# Patient Record
Sex: FEMALE | Race: WHITE | NOT HISPANIC OR LATINO | Employment: OTHER | URBAN - METROPOLITAN AREA
[De-identification: names, ages, dates, MRNs, and addresses within clinical notes are randomized per-mention and may not be internally consistent; named-entity substitution may affect disease eponyms.]

---

## 2017-12-15 PROBLEM — E78.00 PURE HYPERCHOLESTEROLEMIA: Status: ACTIVE | Noted: 2017-12-15

## 2018-06-20 PROBLEM — R19.03 ABDOMINAL MASS, RIGHT LOWER QUADRANT: Status: ACTIVE | Noted: 2018-06-20

## 2018-08-14 PROBLEM — E78.00 PURE HYPERCHOLESTEROLEMIA: Status: RESOLVED | Noted: 2017-12-15 | Resolved: 2018-08-14

## 2020-02-02 PROBLEM — K92.2 ACUTE LOWER GI BLEEDING: Status: ACTIVE | Noted: 2020-02-02

## 2020-02-02 PROBLEM — E86.1 INTRAVASCULAR VOLUME DEPLETION: Status: ACTIVE | Noted: 2020-02-02

## 2020-02-02 PROBLEM — K52.9 ACUTE COLITIS: Status: ACTIVE | Noted: 2020-02-02

## 2020-02-06 ENCOUNTER — TELEPHONE (OUTPATIENT)
Dept: GASTROENTEROLOGY | Facility: CLINIC | Age: 70
End: 2020-02-06

## 2020-02-06 NOTE — TELEPHONE ENCOUNTER
----- Message from Darlin Byers sent at 2/6/2020  9:59 AM CST -----  Contact: pt [phone]  Patient will need a 1 week post op from her colonoscopy that was done yesterday.

## 2020-02-14 ENCOUNTER — OFFICE VISIT (OUTPATIENT)
Dept: GASTROENTEROLOGY | Facility: CLINIC | Age: 70
End: 2020-02-14
Payer: COMMERCIAL

## 2020-02-14 VITALS
BODY MASS INDEX: 23.64 KG/M2 | HEIGHT: 66 IN | SYSTOLIC BLOOD PRESSURE: 122 MMHG | DIASTOLIC BLOOD PRESSURE: 78 MMHG | WEIGHT: 147.06 LBS | HEART RATE: 86 BPM

## 2020-02-14 DIAGNOSIS — Z09 HOSPITAL DISCHARGE FOLLOW-UP: Primary | ICD-10-CM

## 2020-02-14 DIAGNOSIS — Z86.2 HISTORY OF ANEMIA: ICD-10-CM

## 2020-02-14 DIAGNOSIS — K55.9 ISCHEMIC COLITIS: ICD-10-CM

## 2020-02-14 DIAGNOSIS — R10.9 ABDOMINAL CRAMPING: ICD-10-CM

## 2020-02-14 DIAGNOSIS — K52.839 MICROSCOPIC COLITIS, UNSPECIFIED MICROSCOPIC COLITIS TYPE: ICD-10-CM

## 2020-02-14 PROCEDURE — 99214 OFFICE O/P EST MOD 30 MIN: CPT | Mod: S$GLB,,, | Performed by: NURSE PRACTITIONER

## 2020-02-14 PROCEDURE — 99999 PR PBB SHADOW E&M-EST. PATIENT-LVL IV: ICD-10-PCS | Mod: PBBFAC,,, | Performed by: NURSE PRACTITIONER

## 2020-02-14 PROCEDURE — 99214 PR OFFICE/OUTPT VISIT, EST, LEVL IV, 30-39 MIN: ICD-10-PCS | Mod: S$GLB,,, | Performed by: NURSE PRACTITIONER

## 2020-02-14 PROCEDURE — 99999 PR PBB SHADOW E&M-EST. PATIENT-LVL IV: CPT | Mod: PBBFAC,,, | Performed by: NURSE PRACTITIONER

## 2020-02-14 RX ORDER — ASPIRIN 325 MG
325 TABLET, DELAYED RELEASE (ENTERIC COATED) ORAL DAILY
Qty: 30 TABLET | Refills: 11
Start: 2020-02-14 | End: 2021-01-19

## 2020-02-14 NOTE — PROGRESS NOTES
"Subjective:       Patient ID: Lavonne Hernandez is a 69 y.o. female Body mass index is 23.73 kg/m².    Chief Complaint: Follow-up (hospital follow-up)    This patient is new to me.  Established patient of Dr. Avery.     Reviewed hospital discharge summary: "Admission Date: 2/2/2020  Hospital Length of Stay: 2 days  Discharge Date and Time: 2/6/2020 11:47 AM  Attending Physician: Dr. Guerin  Discharging Provider: Con Guerin MD  Primary Care Provider: Nubia Gayle MD  HPI:   Patient is a 69 year old female with past medical history of breast cancer, depression, hyperlipidemia presents with complaints of abdominal pain. Patient states that she has been having issues with loose stool since 12/2019. Patient states that she went to see her PCP and did some x-rays which was suggestive of constipation. She tried a bowel regimen with some success and eventually had some solid stool but then started to have some loose stools again. Patient states that she was feeling constipated so she tried some magnesium citrate this past week prior to admission to clear herself out. Patient states that she has had some abdominal cramping. She started to have some bright red blood per rectum. She denies any fever or chills. Upon evaluation in the ED, patient underwent CT-abd/pelvis which showed pancolitis. She was started on some IV antibiotics. Hospital medicine was consulted for further evaluation.  Procedure(s) (LRB):  COLONOSCOPY (N/A)    Hospital Course:   Patient was admitted to the hospital medicine service with complaints of abdominal pain. CT-abd/pelvis shows pancolitis. Patient was started on IV antibiotics. Patient went for a colonoscopy. Leukocytosis was noted to improve. Patient was noted to have some labile blood pressures initially with episodes of hypotension but improved if IV fluids. Findings of colonoscopy can be found above. Patient's diet was advanced which she was tolerating well. Hypercoagulable work up was " "done which will need to be follow up as an outpatient. Patient was transitioned to oral antibiotics and was eventually discharged home in stable condition with outpatient follow up."    GI Problem   The primary symptoms include abdominal pain (occasional cramping with bowel movement; otherwise negative for abdominal pain; bentyl 20 mg qid prn- helping), nausea and vomiting (vomited once on 2/11/2020 after exercising at the gym; emesis of food; none since then). Primary symptoms do not include fever, weight loss, fatigue, diarrhea, melena, hematemesis, hematochezia or dysuria. The problem has been rapidly improving.   The illness does not include chills, dysphagia, odynophagia or constipation. Associated symptoms comments: Bowel movements 2 small bowel movements daily of formed to fluffy consistency, improving each day; finished antibiotics (cipro & flagyl) last night. Significant associated medical issues include GERD (controlled on prilosec 20 mg once daily).     Review of Systems   Constitutional: Negative for appetite change, chills, fatigue, fever and weight loss.        Patient reports she had donated blood a day or so prior to her hospitalization   HENT: Negative for sore throat and trouble swallowing.    Respiratory: Negative for cough, choking and shortness of breath.    Cardiovascular: Negative for chest pain.   Gastrointestinal: Positive for abdominal pain (occasional cramping with bowel movement; otherwise negative for abdominal pain; bentyl 20 mg qid prn- helping), nausea and vomiting (vomited once on 2/11/2020 after exercising at the gym; emesis of food; none since then). Negative for anal bleeding, blood in stool, constipation, diarrhea, dysphagia, hematemesis, hematochezia, melena and rectal pain.   Genitourinary: Negative for difficulty urinating, dysuria and flank pain.   Neurological: Negative for weakness.       No LMP recorded. Patient is postmenopausal.  Past Medical History:   Diagnosis Date "    Allergy     Breast cancer, stage 2 2009    left sided, s/p double mastectomy, following with [facility]    Colon polyp     Depression     Migraines     Osteopenia     Boniva use for one year    Pure hypercholesterolemia 12/15/2017    [place] node     biopsy     Past Surgical History:   Procedure Laterality Date    BREAST RECONSTRUCTION Bilateral     BREAST SURGERY  6/09    s/p bilateral mastectomy-8/09    CERVICAL CONIZATION   W/ LASER      COLONOSCOPY N/A 2/5/2020    Aden Avery Jr., MD;  Location: [facility];  Service: Endoscopy;  Laterality: N/A; Congested, erythematous, eroded and inflamed mucosa in the sigmoid colon. Biopsied, The examination was suspicious for left-sided ischemic colitis, Erythematous mucosa at the splenic flexure, in the transverse colon, at the hepatic flexure, in the ascending & cecum, hemorrhoids; biopsy: MICROSCOPIC COLITIS    FACIAL COSMETIC SURGERY  09/06/2018    MASTECTOMY Bilateral 2009     Family History   Problem Relation Age of Onset    Cancer Mother 87        Breast ca    Diabetes Mother     Cataracts Mother     Breast cancer Mother 87    Hypertension Father     Diabetes Brother     Stroke Maternal Grandfather     Cataracts Sister     No Known Problems Maternal Aunt     No Known Problems Maternal Uncle     No Known Problems Paternal Aunt     No Known Problems Paternal Uncle     No Known Problems Maternal Grandmother     No Known Problems Paternal Grandmother     No Known Problems Paternal Grandfather     Heart disease Neg Hx     Melanoma Neg Hx     Amblyopia Neg Hx     Blindness Neg Hx     Glaucoma Neg Hx     Macular degeneration Neg Hx     Retinal detachment Neg Hx     Strabismus Neg Hx     Thyroid disease Neg Hx     Colon cancer Neg Hx     Colon polyps Neg Hx     Crohn's disease Neg Hx     Ulcerative colitis Neg Hx     Celiac disease Neg Hx      Wt Readings from Last 10 Encounters:   02/14/20 66.7 kg (147 lb 0.8 oz)   02/02/20 64.4 kg  (142 lb)   01/13/20 66.9 kg (147 lb 8 oz)   01/03/20 67.2 kg (148 lb 1.6 oz)   12/16/19 67.6 kg (149 lb 0.5 oz)   07/23/19 71.7 kg (158 lb)   06/11/19 74.1 kg (163 lb 4.8 oz)   01/22/19 74.8 kg (165 lb)   08/14/18 73.5 kg (162 lb)   06/20/18 73 kg (161 lb)     Lab Results   Component Value Date    WBC 6.06 02/06/2020    HGB 11.3 (L) 02/06/2020    HCT 34.9 (L) 02/06/2020    MCV 89 02/06/2020     02/06/2020     CMP  Sodium   Date Value Ref Range Status   02/06/2020 141 136 - 145 mmol/L Final     Potassium   Date Value Ref Range Status   02/06/2020 3.7 3.5 - 5.1 mmol/L Final     Chloride   Date Value Ref Range Status   02/06/2020 109 95 - 110 mmol/L Final     CO2   Date Value Ref Range Status   02/06/2020 27 22 - 31 mmol/L Final     Glucose   Date Value Ref Range Status   02/06/2020 94 70 - 110 mg/dL Final     Comment:     The ADA recommends the following guidelines for fasting glucose:  Normal:       less than 100 mg/dL  Prediabetes:  100 mg/dL to 125 mg/dL  Diabetes:     126 mg/dL or higher       BUN, Bld   Date Value Ref Range Status   02/06/2020 3 (L) 7 - 18 mg/dL Final     Creatinine   Date Value Ref Range Status   02/06/2020 0.70 0.50 - 1.40 mg/dL Final     Calcium   Date Value Ref Range Status   02/06/2020 8.8 8.4 - 10.2 mg/dL Final     Total Protein   Date Value Ref Range Status   02/02/2020 7.1 6.0 - 8.4 g/dL Final     Albumin   Date Value Ref Range Status   02/02/2020 4.2 3.5 - 5.2 g/dL Final     Total Bilirubin   Date Value Ref Range Status   02/02/2020 0.4 0.2 - 1.3 mg/dL Final     Alkaline Phosphatase   Date Value Ref Range Status   02/02/2020 62 38 - 145 U/L Final     AST   Date Value Ref Range Status   02/02/2020 32 14 - 36 U/L Final     ALT   Date Value Ref Range Status   02/02/2020 23 0 - 35 U/L Final     Anion Gap   Date Value Ref Range Status   02/06/2020 5 (L) 8 - 16 mmol/L Final     eGFR if    Date Value Ref Range Status   02/06/2020 >60 >60 mL/min/1.73 m^2 Final     eGFR if  non    Date Value Ref Range Status   02/06/2020 >60 >60 mL/min/1.73 m^2 Final     Comment:     Calculation used to obtain the estimated glomerular filtration  rate (eGFR) is the CKD-EPI equation.        Lab Results   Component Value Date    TSH 1.650 05/12/2016     Reviewed prior medical records including radiology report of 2/2/2020 ct abdomen pelvis; 7/3/18 limited abdominal ultrasound; & endoscopy history (see surgical history).    Objective:      Physical Exam   Constitutional: She is oriented to person, place, and time. She appears well-developed and well-nourished. No distress.   HENT:   Mouth/Throat: Oropharynx is clear and moist and mucous membranes are normal. No oral lesions. No oropharyngeal exudate.   Eyes: Pupils are equal, round, and reactive to light. Conjunctivae are normal. No scleral icterus.   Pulmonary/Chest: Effort normal and breath sounds normal. No respiratory distress. She has no wheezes.   Abdominal: Soft. Normal appearance and bowel sounds are normal. She exhibits no distension, no abdominal bruit and no mass. There is tenderness (minimal) in the right lower quadrant, suprapubic area and left lower quadrant. There is no rigidity, no rebound, no guarding, no tenderness at McBurney's point and negative Rhoades's sign.   Neurological: She is alert and oriented to person, place, and time.   Skin: Skin is warm and dry. No rash noted. She is not diaphoretic. No erythema. No pallor.   Non-jaundiced   Psychiatric: She has a normal mood and affect. Her behavior is normal. Judgment and thought content normal.   Nursing note and vitals reviewed.      Assessment:       1. Hospital discharge follow-up    2. Microscopic colitis, unspecified microscopic colitis type    3. History of anemia    4. Ischemic colitis    5. Abdominal cramping        Plan:     discussed case with Dr. Avery; Dr. Avery reports he strongly believes she had ischemic colitis since microscopic colitis does not typically  cause visible endoscopic findings. Dr. Avery recommends patient start aspirin 325 mg once daily    Hospital discharge follow-up, Microscopic colitis, unspecified microscopic colitis type, & Ischemic colitis  -  START OTC  aspirin (ECOTRIN) 325 MG EC tablet; Take 1 tablet (325 mg total) by mouth once daily.  Dispense: 30 tablet; Refill: 11  - if diarrhea recurs, recommend patient follow-up for further evaluation and management; patient verbalized understanding. Dr. Avery recommended if diarrhea recurs to treat it with a course of pepto bismuth.    History of anemia  -     CBC auto differential; Future; Expected date: 02/28/202  Recommend follow-up with Primary Care Provider for continued evaluation and management.    Abdominal cramping  - CONTINUE BENTYL 20 MG QID PRN AS DIRECTED  - CONTINUE OTC PROBIOTIC ONCE DAILY AS DIRECTED    Follow up in about 1 month (around 3/14/2020), or if symptoms worsen or fail to improve, for follow-up with Dr. Avery for continued evaluation and management.      If no improvement in symptoms or symptoms worsen, call/follow-up at clinic or go to ER.

## 2021-01-19 PROBLEM — K52.9 COLITIS: Status: RESOLVED | Noted: 2020-02-02 | Resolved: 2021-01-19

## 2021-01-19 PROBLEM — K92.2 ACUTE LOWER GI BLEEDING: Status: RESOLVED | Noted: 2020-02-02 | Resolved: 2021-01-19

## 2023-05-15 ENCOUNTER — CLINICAL SUPPORT (OUTPATIENT)
Dept: AUDIOLOGY | Facility: CLINIC | Age: 73
End: 2023-05-15
Payer: MEDICARE

## 2023-05-15 DIAGNOSIS — H90.3 BILATERAL SENSORINEURAL HEARING LOSS: Primary | ICD-10-CM

## 2023-05-15 DIAGNOSIS — H93.13 BILATERAL TINNITUS: ICD-10-CM

## 2023-05-15 DIAGNOSIS — H91.93 BILATERAL HEARING LOSS, UNSPECIFIED HEARING LOSS TYPE: ICD-10-CM

## 2023-05-15 PROCEDURE — 99999 PR PBB SHADOW E&M-EST. PATIENT-LVL I: ICD-10-PCS | Mod: PBBFAC,,, | Performed by: AUDIOLOGIST

## 2023-05-15 PROCEDURE — 92557 COMPREHENSIVE HEARING TEST: CPT | Mod: PBBFAC,PO | Performed by: AUDIOLOGIST

## 2023-05-15 PROCEDURE — 92567 TYMPANOMETRY: CPT | Mod: PBBFAC,PO | Performed by: AUDIOLOGIST

## 2023-05-15 PROCEDURE — 99999 PR PBB SHADOW E&M-EST. PATIENT-LVL I: CPT | Mod: PBBFAC,,, | Performed by: AUDIOLOGIST

## 2023-05-15 PROCEDURE — 99211 OFF/OP EST MAY X REQ PHY/QHP: CPT | Mod: PBBFAC,PO | Performed by: AUDIOLOGIST

## 2023-05-15 NOTE — PROGRESS NOTES
Lavonne Hernandez was seen 05/15/2023 for an audiological evaluation.      Pt reported difficulty understanding speech clearly in restaurants.  She has bilateral tinnitus.  Her last audiogram in 2014 showed essentially normal hearing bilaterally.      Otoscopy revealed clear view of both external ear canals and tympanic membranes.  No obstructive cerumen present in either ear canal.       Audiogram results revealed a mild-to-moderate sensorineural hearing loss for the right ear and a mild-to-moderate sensorineural hearing loss for the left ear.  Speech Reception Thresholds were 20 dBHL for the right ear and 20 dBHL for the left ear.  Word recognition scores were good for the right ear and good for the left ear.   Tympanograms were Type Ad for the right ear and Type Ad for the left ear.    Audiogram results were reviewed in detail with patient and all questions were answered. Results will be reviewed by referring provider at the completion of this note.     Recommend binaural amplification pending medical clearance, hearing protection for all loud sounds and annual audiogram to monitor hearing loss.

## 2023-05-15 NOTE — Clinical Note
"Kin Anderson,   Please see the attached audiogram and notes from Lavonne Hernandez's visit to Audiology today.  I reviewed the recommendations with her and answered her questions.  If you do not have any concerns about her proceeding with hearing aids please document in her chart that she is "medically cleared for hearing aids" so that she can proceed with getting them when she is ready to do so. Thank you for referring her to audiology.    Imani Underwood"

## 2023-07-25 ENCOUNTER — OFFICE VISIT (OUTPATIENT)
Dept: OTOLARYNGOLOGY | Facility: CLINIC | Age: 73
End: 2023-07-25
Payer: MEDICARE

## 2023-07-25 VITALS — BODY MASS INDEX: 26.33 KG/M2 | WEIGHT: 163.81 LBS | HEIGHT: 66 IN

## 2023-07-25 DIAGNOSIS — Z78.9 INTOLERANCE OF CONTINUOUS POSITIVE AIRWAY PRESSURE (CPAP) VENTILATION: ICD-10-CM

## 2023-07-25 DIAGNOSIS — G47.33 OSA (OBSTRUCTIVE SLEEP APNEA): Primary | ICD-10-CM

## 2023-07-25 PROCEDURE — 99204 PR OFFICE/OUTPT VISIT, NEW, LEVL IV, 45-59 MIN: ICD-10-PCS | Mod: S$PBB,,, | Performed by: OTOLARYNGOLOGY

## 2023-07-25 PROCEDURE — 99204 OFFICE O/P NEW MOD 45 MIN: CPT | Mod: S$PBB,,, | Performed by: OTOLARYNGOLOGY

## 2023-07-25 PROCEDURE — 99999 PR PBB SHADOW E&M-EST. PATIENT-LVL IV: ICD-10-PCS | Mod: PBBFAC,,, | Performed by: OTOLARYNGOLOGY

## 2023-07-25 PROCEDURE — 99214 OFFICE O/P EST MOD 30 MIN: CPT | Mod: PBBFAC,PO | Performed by: OTOLARYNGOLOGY

## 2023-07-25 PROCEDURE — 99999 PR PBB SHADOW E&M-EST. PATIENT-LVL IV: CPT | Mod: PBBFAC,,, | Performed by: OTOLARYNGOLOGY

## 2023-07-25 NOTE — PROGRESS NOTES
Subjective:       Patient ID: Lavonne Hernandez is a 72 y.o. female.    Chief Complaint: discuss yamilet Banerjee is here for Obstructive sleep apnea and intolerance of CPAP.  she was diagnosed with MAURIZIO  years ago.   Last sleep study: 2016. CHRIS / AHI: 37; was positional at that time, but I do not have the data  she has issues with CPAP compliance: She had difficulty with a titration   Admittedly, she hasn't tried it in a few years. She has a good friend who is a neurobiologist who focuses on sleep and recommended that she retrial CPAP prior to surgical therapy.   Previous surgical treatments for sleep apnea: none  Body mass index is 26.44 kg/m².      Pertinent medical issues: Depression, migraines  Anticoagulation: none  Pertinent surgery: none    Patient validated questionnaires (if applicable):     NOSE score:: (P) 25%       No flowsheet data found.  No flowsheet data found.  No flowsheet data found.         Social History     Tobacco Use   Smoking Status Former    Years: 6.00    Types: Cigarettes    Quit date: 1977    Years since quittin.6    Passive exposure: Past   Smokeless Tobacco Never     Social History     Substance and Sexual Activity   Alcohol Use No          Objective:        Constitutional:   She is oriented to person, place, and time. She appears well-developed and well-nourished. She appears alert. She is active. Normal speech.      Head:  Normocephalic and atraumatic. Head is without TMJ tenderness. No scars. Salivary glands normal.  Facial strength is normal.      Ears:    Right Ear: No drainage or swelling. No middle ear effusion.   Left Ear: No drainage or swelling.  No middle ear effusion.     Nose:  No mucosal edema, rhinorrhea or sinus tenderness. No turbinate hypertrophy.      Mouth/Throat  Oropharynx clear and moist without lesions or asymmetry, normal uvula midline and mirror exam normal. Normal dentition. No uvula swelling, lacerations or trismus. No oropharyngeal exudate.  Tonsillar erythema, tonsillar exudate.      Neck:  Full range of motion with neck supple and no adenopathy. Thyroid tenderness is present. No tracheal deviation, no edema, no erythema, normal range of motion, no stridor, no crepitus and no neck rigidity present. No thyroid mass present.     Cardiovascular:    Intact distal pulses and normal pulses.              Pulmonary/Chest:   Effort normal and breath sounds normal. No stridor.     Psychiatric:   Her speech is normal and behavior is normal. Her mood appears not anxious. Her affect is not labile.     Neurological:   She is alert and oriented to person, place, and time. No sensory deficit.     Skin:   No abrasions, lacerations, lesions, or rashes. No abrasion and no bruising noted.       Tests / Results:  None    Assessment:       1. MAURIZIO (obstructive sleep apnea)    2. Intolerance of continuous positive airway pressure (CPAP) ventilation    3. BMI 26.0-26.9,adult          Plan:       Discussed background of MAURIZIO at length including medical therapy and surgical therapy    Patient seems to have a good understanding of sleep apnea. She would prefer a non-surgical approach though she understands that this should be considered if she continues to be intolerant to therapy.   We will repeat a home sleep test. She would like to be referred back to Sleep med to discuss newer PAP options. If she struggles, we will consider Inspire candidacy with [facility]    I discussed the risks of hypoglossal nerve stimulation including: scar, bleeding, numbness of incision, numbness or weakness of tongue or marginal mandibular nerve, irritation of tongue from stimulation, implant failure, inadequate improvement, need for further procedures, need for removal of implant, infection, pneumothorax, MRI incompatibility.

## 2023-07-25 NOTE — LETTER
July 28, 2023      Monica Casillas, NP  [address]  [address]  [facility] [address]           [facility]  [address] [place] [address]  Phone: [phone]  Fax: [phone]          Patient: Lavonne Hernandez   MR Number: [id number]   YOB: 1950   Date of Visit: 7/25/2023       Dear Monica Casillas:    Thank you for referring Lavonne Hernandez to me for evaluation. Attached you will find relevant portions of my assessment and plan of care.    If you have questions, please do not hesitate to call me. I look forward to following Lavonne Hernandez along with you.    Sincerely,    Ronan Lerma MD    Enclosure  CC:  No Recipients    If you would like to receive this communication electronically, please contact [email or web address] or [phone] to request more information on [employer] access.    For providers and/or their staff who would like to refer a patient to [facility], please contact us through our one-stop-shop provider referral line, [facility], at [phone].    If you feel you have received this communication in error or would no longer like to receive these types of communications, please e-mail [email or web address]

## 2023-08-15 ENCOUNTER — TELEPHONE (OUTPATIENT)
Dept: OTOLARYNGOLOGY | Facility: CLINIC | Age: 73
End: 2023-08-15
Payer: MEDICARE

## 2023-08-15 NOTE — TELEPHONE ENCOUNTER
----- Message from Ronan Lerma MD sent at 8/15/2023  4:18 PM CDT -----  Lavonne,    Your sleep study confirmed moderate sleep apnea, you pause or stopped breathing approximately 23 times per hour.  Based on this, both retrial ing CPAP (PAP) and Inspire are on the table.  I know you had expressed at the visit that you would prefer non -surgical at first and come back to it if you continue to struggle with PAP. Let me know if you need help facilitating an appt with Dr. Lau or if you would like to re-discuss options.    Ronan Lerma MD  Otolaryngology - Head and Neck Surgery  Office: [phone]  Cell: [phone]  Fax: [phone]    This message was generated using voice dictation. Please excuse any errors that may have been created by the transcription software.

## 2023-08-17 PROBLEM — F33.41 RECURRENT MAJOR DEPRESSIVE DISORDER, IN PARTIAL REMISSION: Status: ACTIVE | Noted: 2023-08-17

## 2023-08-17 NOTE — TELEPHONE ENCOUNTER
I s/w pt to make sure she has received the results. Pt agreed and stated that she logged online to read the results and has no questions at this time. I advised pt to call back with any other questions. Pt understood

## 2023-08-17 NOTE — TELEPHONE ENCOUNTER
----- Message from Ronan Lerma MD sent at 8/15/2023  4:18 PM CDT -----  Lavonne,    Your sleep study confirmed moderate sleep apnea, you pause or stopped breathing approximately 23 times per hour.  Based on this, both retrial ing CPAP (PAP) and Inspire are on the table.  I know you had expressed at the visit that you would prefer non -surgical at first and come back to it if you continue to struggle with PAP. Let me know if you need help facilitating an appt with Dr. Lau or if you would like to re-discuss options.    Ronan Lerma MD  Otolaryngology - Head and Neck Surgery  Office: [phone]  Cell: [phone]  Fax: [phone]    This message was generated using voice dictation. Please excuse any errors that may have been created by the transcription software.

## 2023-08-24 ENCOUNTER — PATIENT MESSAGE (OUTPATIENT)
Dept: OTOLARYNGOLOGY | Facility: CLINIC | Age: 73
End: 2023-08-24
Payer: MEDICARE

## 2023-08-24 NOTE — TELEPHONE ENCOUNTER
Patient feels the CPAP has caused her ETD issues to return.  She has added the Astelin and will add Flonase and continue to pop her ears. Please advise if any other recommendations

## 2023-09-05 ENCOUNTER — PATIENT MESSAGE (OUTPATIENT)
Dept: OTOLARYNGOLOGY | Facility: CLINIC | Age: 73
End: 2023-09-05
Payer: MEDICARE

## 2023-09-08 ENCOUNTER — OFFICE VISIT (OUTPATIENT)
Dept: OTOLARYNGOLOGY | Facility: CLINIC | Age: 73
End: 2023-09-08
Payer: MEDICARE

## 2023-09-08 VITALS — WEIGHT: 166.25 LBS | HEIGHT: 66 IN | BODY MASS INDEX: 26.72 KG/M2

## 2023-09-08 DIAGNOSIS — H93.293 IMPAIRED AUDITORY DISCRIMINATION, BILATERAL: ICD-10-CM

## 2023-09-08 DIAGNOSIS — H69.01 PATULOUS EUSTACHIAN TUBE OF RIGHT EAR: Primary | ICD-10-CM

## 2023-09-08 DIAGNOSIS — H91.21 SUDDEN IDIOPATHIC HEARING LOSS OF RIGHT EAR, UNSPECIFIED HEARING STATUS ON CONTRALATERAL SIDE: ICD-10-CM

## 2023-09-08 PROCEDURE — 99214 OFFICE O/P EST MOD 30 MIN: CPT | Mod: S$PBB,,, | Performed by: OTOLARYNGOLOGY

## 2023-09-08 PROCEDURE — 99214 PR OFFICE/OUTPT VISIT, EST, LEVL IV, 30-39 MIN: ICD-10-PCS | Mod: S$PBB,,, | Performed by: OTOLARYNGOLOGY

## 2023-09-08 PROCEDURE — 99999 PR PBB SHADOW E&M-EST. PATIENT-LVL III: CPT | Mod: PBBFAC,,, | Performed by: OTOLARYNGOLOGY

## 2023-09-08 PROCEDURE — 99213 OFFICE O/P EST LOW 20 MIN: CPT | Mod: PBBFAC,PO | Performed by: OTOLARYNGOLOGY

## 2023-09-08 PROCEDURE — 99999 PR PBB SHADOW E&M-EST. PATIENT-LVL III: ICD-10-PCS | Mod: PBBFAC,,, | Performed by: OTOLARYNGOLOGY

## 2023-09-08 NOTE — PROGRESS NOTES
Subjective:       Patient ID: Lavonne Hernandez is a 72 y.o. female.    Chief Complaint: Ear Fullness    Lavonne is here for follow-up of MAURIZIO.   She developed right aural fullness with PAP use approx 2 weeks ago.  She has associated pulsatile tinnitus, no pain, hearing is decreased.  She has used Astelin and Flonase with no change to symptoms.   She tried Afrin for 3 days without improvement.   She can insufflate but no improvement. She has audible respirations and autophony.     Patient validated questionnaires (if applicable):      %            No data to display                   No data to display                   No data to display                     Review of Systems   Constitutional: Negative for activity change and appetite change.   Respiratory: Negative for difficulty breathing and wheezing   Cardiovascular: Negative for chest pain.      Objective:        Constitutional:   Vital signs are normal. She appears well-developed and well-nourished.     Head:  Normocephalic and atraumatic.     Ears:  Hearing normal to normal and whispered voice; external ear normal without scars, lesions, or masses; ear canal, tympanic membrane, and middle ear normal..   Mild explosion right posterior tympanic membrane with nasal breathing    Nose:  Nose normal including turbinates, nasal mucosa, sinuses and nasal septum.     Mouth/Throat  Oropharynx clear and moist without lesions or asymmetry.     Neck:  Neck normal without thyromegaly masses, asymmetry, normal tracheal structure, crepitus, and tenderness.         Tests / Results:  none    Assessment:       1. Patulous eustachian tube of right ear    2. Impaired auditory discrimination, bilateral          Plan:         Recommend repeating audiogram with her reported change in right-sided hearing since May.  Clinical symptoms and exam are consistent with patulous eustachian tube in the right ear which is not likely related to her beginning of CPAP.  Currently her CPAP is  being tolerated very well and this is not worsening her ear symptoms.  I discussed trial of patulEND.  If her symptoms progress, we could consider concern of myringotomy or TM mass loading

## 2023-09-08 NOTE — PATIENT INSTRUCTIONS
Name of drop is PatSummerND  Need to repeat hearing test  Can consider conservative procedures if this worsens. Let's see how the drops and time do.

## 2023-09-13 ENCOUNTER — CLINICAL SUPPORT (OUTPATIENT)
Dept: AUDIOLOGY | Facility: CLINIC | Age: 73
End: 2023-09-13
Payer: MEDICARE

## 2023-09-13 DIAGNOSIS — H69.01 PATULOUS EUSTACHIAN TUBE OF RIGHT EAR: ICD-10-CM

## 2023-09-13 DIAGNOSIS — H90.3 BILATERAL SENSORINEURAL HEARING LOSS: Primary | ICD-10-CM

## 2023-09-13 DIAGNOSIS — H91.21 SUDDEN IDIOPATHIC HEARING LOSS OF RIGHT EAR, UNSPECIFIED HEARING STATUS ON CONTRALATERAL SIDE: ICD-10-CM

## 2023-09-13 DIAGNOSIS — H93.13 BILATERAL TINNITUS: ICD-10-CM

## 2023-09-13 PROCEDURE — 99999 PR PBB SHADOW E&M-EST. PATIENT-LVL II: CPT | Mod: PBBFAC,,, | Performed by: AUDIOLOGIST

## 2023-09-13 PROCEDURE — 92557 COMPREHENSIVE HEARING TEST: CPT | Mod: PBBFAC,PO | Performed by: AUDIOLOGIST

## 2023-09-13 PROCEDURE — 99999 PR PBB SHADOW E&M-EST. PATIENT-LVL II: ICD-10-PCS | Mod: PBBFAC,,, | Performed by: AUDIOLOGIST

## 2023-09-13 PROCEDURE — 92567 TYMPANOMETRY: CPT | Mod: PBBFAC,PO | Performed by: AUDIOLOGIST

## 2023-09-13 PROCEDURE — 99212 OFFICE O/P EST SF 10 MIN: CPT | Mod: PBBFAC,PO | Performed by: AUDIOLOGIST

## 2023-09-13 NOTE — Clinical Note
Dr. Lerma,   Pt's audiogram showed no significant change since her May 2023 audiogram and no significant asymmetry of the right ear.  Please call pt to discuss results as needed.  She stated that she planned to order the drops you talked to her about since there hearing test showed no major change in the hearing in her right ear.   Thanks,  Imani

## 2023-09-13 NOTE — PROGRESS NOTES
Lavonne Hernandez was seen 09/13/2023 for an audiological evaluation.      Pt reported a recent sensation of decreased hearing in her right ear.      Otoscopy revealed clear view of both external ear canals and tympanic membranes.  No obstructive cerumen present in either ear canal.       Audiogram results revealed a mild-to-moderate sensorineural hearing loss for the right ear and a mild-to-moderate sensorineural hearing loss for the left ear.  Speech Reception Thresholds were 25 dBHL for the right ear and 15 dBHL for the left ear.  Word recognition scores were good for the right ear and good for the left ear.   Tympanograms were Type Ad for the right ear and Type Ad for the left ear.    Audiogram results were reviewed in detail with patient and all questions were answered. Results will be reviewed by ENT at the completion of this note.     Recommend f/u with ENT for further treatment as needed for right aural symptoms, binaural amplification pending medical clearance, hearing protection for all loud sounds and annual audiogram to monitor hearing loss.

## 2023-09-18 ENCOUNTER — PATIENT MESSAGE (OUTPATIENT)
Dept: OTOLARYNGOLOGY | Facility: CLINIC | Age: 73
End: 2023-09-18
Payer: MEDICARE

## 2023-10-19 ENCOUNTER — OFFICE VISIT (OUTPATIENT)
Dept: NEUROSURGERY | Facility: CLINIC | Age: 73
End: 2023-10-19
Payer: MEDICARE

## 2023-10-19 VITALS
SYSTOLIC BLOOD PRESSURE: 133 MMHG | DIASTOLIC BLOOD PRESSURE: 80 MMHG | BODY MASS INDEX: 25.97 KG/M2 | RESPIRATION RATE: 16 BRPM | WEIGHT: 161.63 LBS | HEART RATE: 86 BPM | HEIGHT: 66 IN

## 2023-10-19 DIAGNOSIS — R93.7 ABNORMAL X-RAY OF LUMBAR SPINE: ICD-10-CM

## 2023-10-19 DIAGNOSIS — R20.0 ANESTHESIA OF SKIN: Primary | ICD-10-CM

## 2023-10-19 DIAGNOSIS — M54.16 LUMBAR RADICULOPATHY, CHRONIC: ICD-10-CM

## 2023-10-19 DIAGNOSIS — M54.9 BACK PAIN, UNSPECIFIED BACK LOCATION, UNSPECIFIED BACK PAIN LATERALITY, UNSPECIFIED CHRONICITY: ICD-10-CM

## 2023-10-19 PROCEDURE — 99204 OFFICE O/P NEW MOD 45 MIN: CPT | Mod: S$PBB,,, | Performed by: NURSE PRACTITIONER

## 2023-10-19 PROCEDURE — 99204 PR OFFICE/OUTPT VISIT, NEW, LEVL IV, 45-59 MIN: ICD-10-PCS | Mod: S$PBB,,, | Performed by: NURSE PRACTITIONER

## 2023-10-19 NOTE — PROGRESS NOTES
Neurosurgery History & Physical    Patient ID: Lavonne Hernandez is a 72 y.o. female.    Chief Complaint   Patient presents with    Back Pain     Tingling with activity, back, lateral thighs, feet, glut       History of Present Illness:   Ms. Hernandez is a 72 year old female who presents today for evaluation of recent imaging and discussion of symptoms. A myriad of symptoms began after covid diagnosis in February 2023. Initially she began with extreme fatigue, SOB and was diagnosed with atypical pneumonia. In July she began with pain in the upper thoracic region that eventually migrated in to the lumbar region and down the LEs. In the LE's she mainly feels tingling into the lateral thighs and plantar aspect of bilateral feet. She also reports tingling in bilateral hands. These symptoms worsened with exertion/activity.     After about 2 months of these symptoms, they suddenly resolved for the most part. She has persistent tingling and paresthesias in the right thumb. It is painful when she uses for fine motor skills like opening jars. She denies  weakness, gait instability, UE pain.    She was taking celebrex and is currently taking gabapentin (600,300,300).     She is doing exercises and stretches taught to her in the past which  have been helpful.     Review of Systems   Constitutional:  Negative for activity change.   HENT:  Negative for hearing loss, trouble swallowing and voice change.    Eyes:  Negative for photophobia and visual disturbance.   Respiratory:  Negative for cough.    Cardiovascular:  Negative for chest pain.   Gastrointestinal:  Negative for nausea and vomiting.   Endocrine: Negative for cold intolerance and heat intolerance.   Genitourinary:  Negative for difficulty urinating.   Musculoskeletal:  Positive for back pain and myalgias. Negative for gait problem and neck pain.   Skin:  Negative for wound.   Allergic/Immunologic: Negative for immunocompromised state.   Neurological:  Negative  for weakness and numbness.        Tingling   Psychiatric/Behavioral:  Negative for confusion.        Past Medical History:   Diagnosis Date    Allergy     Breast cancer, stage 2 2009    left sided, s/p double mastectomy, following with [facility]    Colitis     Colon polyp     Depression     Hiatal hernia with GERD     History of pneumonia     Migraines     Osteopenia     Boniva use for one year    PONV (postoperative nausea and vomiting)     Pure hypercholesterolemia 12/15/2017    [place] node     biopsy    Sleep apnea     uses cpap     Social History     Socioeconomic History    Marital status: Single   Occupational History    Occupation: nurse practitioner     Employer: [employer] ([employer])   Tobacco Use    Smoking status: Former     Current packs/day: 0.00     Types: Cigarettes     Start date: 1971     Quit date: 1977     Years since quittin.8     Passive exposure: Past    Smokeless tobacco: Never   Substance and Sexual Activity    Alcohol use: No    Drug use: No   Other Topics Concern    Are you pregnant or think you may be? No    Breast-feeding No   Social History Narrative    May 2015    Lavonne lives on the [place].  Her office is located on the first floor of the hospital by [facility].  She is in palliative care.  She is a nurse practitioner.  She enjoys symptom management.    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    In October she will be going to to [facility] on a medical mission to explore end-of-life approaches in that culture.    The patient has never  she does not have any children.  She is not currently involved in a relationship.  She is very close to her sister, brother-in-law and nephew who is leaving town to go to [facility] to start a math PhD program    She takes care of her elderly mother who is 94 years old but independent.  Originally from [place] and [facility].  Her maternal grandmother  at the age of 106.          Mom soon 95    [place]dep. claire the [place].  Her mother is more frail.    2 BR    Just assisted    Sister is retired     Family History   Problem Relation Age of Onset    Cancer Mother 87        Breast ca    Diabetes Mother     Cataracts Mother     Breast cancer Mother 87    Hypertension Father     Diabetes Brother     Stroke Maternal Grandfather     Cataracts Sister     No Known Problems Maternal Aunt     No Known Problems Maternal Uncle     No Known Problems Paternal Aunt     No Known Problems Paternal Uncle     No Known Problems Maternal Grandmother     No Known Problems Paternal Grandmother     No Known Problems Paternal Grandfather     Heart disease Neg Hx     Melanoma Neg Hx     Amblyopia Neg Hx     Blindness Neg Hx     Glaucoma Neg Hx     Macular degeneration Neg Hx     Retinal detachment Neg Hx     Strabismus Neg Hx     Thyroid disease Neg Hx     Colon cancer Neg Hx     Colon polyps Neg Hx     Crohn's disease Neg Hx     Ulcerative colitis Neg Hx     Celiac disease Neg Hx      Review of patient's allergies indicates:   Allergen Reactions    Adhesive Blisters     The clear tegaderm    Dye Hives     contrast       Current Outpatient Medications:     albuterol (PROVENTIL/VENTOLIN HFA) 90 mcg/actuation inhaler, Inhale 1-2 puffs into the lungs every 4 (four) hours as needed for Wheezing. Rescue, Disp: 18 g, Rfl: 5    calcium carbonate/vitamin D3 (CALCARB 600 WITH VITAMIN D ORAL), Take 1 tablet by mouth 2 (two) times daily., Disp: , Rfl:     cetirizine (ZYRTEC) 10 MG tablet, Take 1 tablet by mouth once daily. , Disp: , Rfl:     ferrous gluconate (FERGON) 324 MG tablet, Take 1 tablet (324 mg total) by mouth daily with breakfast., Disp: 30 tablet, Rfl: 5    gabapentin (NEURONTIN) 300 MG capsule, Take 1 capsule (300 mg total) by mouth 3 (three) times daily., Disp: 270 capsule, Rfl: 3    omeprazole (PRILOSEC OTC) 20 MG tablet, Take 2 tablets (40 mg total) by mouth every morning., Disp: 90 tablet, Rfl: 3    rosuvastatin  "(CRESTOR) 5 MG tablet, Take 1 tablet (5 mg total) by mouth once daily., Disp: 90 tablet, Rfl: 3    sodium chloride 7% 7 % nebulizer solution, Take 4 mLs by nebulization 2 (two) times daily., Disp: 60 mL, Rfl: 3    sumatriptan (IMITREX) 100 MG tablet, Take 1 tablet at the onset of headache, may repeat dose after 2 hours for a max of 2 doses per 24 hours., Disp: 9 tablet, Rfl: 5    venlafaxine (EFFEXOR-XR) 150 MG Cp24, Take 1 capsule (150 mg total) by mouth once daily., Disp: 90 capsule, Rfl: 3    celecoxib (CELEBREX) 200 MG capsule, Take 1 capsule (200 mg total) by mouth 2 (two) times daily as needed for Pain., Disp: 60 capsule, Rfl: 5    predniSONE (DELTASONE) 10 MG tablet, Take 4 tablets (40 mg total) by mouth once daily for 5 days, THEN 3.5 tablets (35 mg total) once daily for 7 days, THEN 3 tablets (30 mg total) once daily for 7 days, THEN 2.5 tablets (25 mg total) once daily for 7 days, THEN 2 tablets (20 mg total) once daily. (Patient not taking: Reported on 10/19/2023), Disp: 143 tablet, Rfl: 1  No current facility-administered medications for this visit.    Facility-Administered Medications Ordered in Other Visits:     lactated ringers infusion, , Intravenous, Continuous, Phuc Pinto MD, Stopped at 12/06/21 1620    lactated ringers infusion, , Intravenous, Continuous, Maty Mccracken MD, Last Rate: 20 mL/hr at 01/17/22 1200, Restarted at 01/17/22 1244    LIDOcaine (PF) 10 mg/ml (1%) injection 10 mg, 1 mL, Other, Once, Phuc Pinto MD    LIDOcaine (PF) 10 mg/ml (1%) injection 2 mg, 0.2 mL, Intradermal, Once, Maty Mccracken MD  Blood pressure 133/80, pulse 86, resp. rate 16, height 5' 6" (1.676 m), weight 73.3 kg (161 lb 9.6 oz).      Neurologic Exam     Mental Status   Oriented to person, place, and time.   Level of consciousness: alert    Cranial Nerves     CN III, IV, VI   Extraocular motions are normal.     CN VII   Facial expression full, symmetric.     Motor Exam   Muscle bulk: " normal  Overall muscle tone: normal    Strength   Strength 5/5 throughout.     Sensory Exam   Light touch normal.     Gait, Coordination, and Reflexes     Gait  Gait: normal    Coordination   Tandem walking coordination: normal    Reflexes   Right brachioradialis: 2+  Left brachioradialis: 2+  Right biceps: 2+  Left biceps: 2+  Right patellar: 2+  Left patellar: 2+  Right Daugherty: absent  Left Daugherty: absent  Right ankle clonus: absent  Left ankle clonus: absent      Imaging personally reviewed and discussed with the patient.  CT L spine:   FINDINGS:  Five lumbar-type vertebral bodies.     Trace retrolisthesis of L2 on L3.     No fractures demonstrated.  Vertebral body heights are preserved.     LUMBAR DISC LEVELS:     T12-L1:  No disc herniation or significant posterior osteophytic ridging. No significant spinal canal or foraminal stenosis.     L1-L2: No disc herniation or significant posterior osteophytic ridging.  Minimal bilateral facet hypertrophy.  No significant spinal canal or foraminal stenosis.     L2-L3: Trace retrolisthesis.  Minimal disc bulge.  No significant spinal canal stenosis.  Minimal bilateral foraminal stenosis.     L3-L4: Mild disc bulge.  Ligamentum flavum thickening.  No significant spinal canal stenosis.  Mild right foraminal stenosis.     L4-L5: Mild disc bulge.  Small superimposed central extrusion.  Mild bilateral facet hypertrophy.  Mild-moderate spinal canal stenosis.  Mild bilateral foraminal stenosis.     L5-S1: Minimal disc bulge.  No significant spinal canal or foraminal stenosis.     Paravertebral soft tissues are normal.     Impression:     1. Multilevel spondylosis, greatest at L4-5 where there is mild-moderate spinal canal stenosis.  2. Mild foraminal stenosis on the right at L3-4 and bilaterally at L4-5.    XR L spine:   FINDINGS:  There are 5 lumbar type vertebral bodies.  Rotatory dextroscoliosis of the lumbar spine.  There is no spondylolisthesis.  There is bilateral L5  pars interarticularis defects.  Vertebral body heights are maintained.  Multilevel degenerative disc disease throughout the lumbar spine with marginal anterior spondylotic osteophytes.  There is also multilevel facet arthropathy more pronounced at the L4-5 and L5-S1 disc spaces.  Paraspinal soft tissues are unremarkable.  SI joints demonstrate no gross abnormalities.     There is moderate to large amount of colonic stool burden throughout the colon.     Impression:     1. Rotatory dextroscoliosis of the lumbar spine as above.  2. 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  This finding can be better evaluated with a dedicated CT scan of the lumbar spine and sagittal reformats.  3. Multilevel facet arthropathy and mild multilevel degenerative disc disease throughout the lumbar spine.      Assessment & Plan:   72 year old female with hand paresthesias, LE numbness, and pain which has since resolved. I discussed lumbar spine imaging, which demonstrates likely pars defects. We discussed the nature of these and I do not feel she was symptomatic from this. I would however like to obtain dynamic XR and MRI of the lumbar spine as well as cervical MRI given her symptoms and rule out neural compression and/or instability.     I will call with results.

## 2023-11-09 PROBLEM — D50.9 IRON DEFICIENCY ANEMIA: Status: ACTIVE | Noted: 2023-11-09

## 2023-11-09 PROBLEM — J84.89 ORGANIZING PNEUMONIA: Status: ACTIVE | Noted: 2023-11-09

## 2024-02-12 PROBLEM — J84.89 ORGANIZING PNEUMONIA: Status: RESOLVED | Noted: 2023-11-09 | Resolved: 2024-02-12

## 2024-03-18 PROBLEM — K22.4 DYSKINESIA OF ESOPHAGUS: Status: ACTIVE | Noted: 2024-03-18

## 2024-03-18 PROBLEM — U09.9 COVID-19 LONG HAULER: Status: ACTIVE | Noted: 2024-03-18

## 2024-03-18 PROBLEM — K44.9 HIATAL HERNIA: Status: ACTIVE | Noted: 2024-03-18
